# Patient Record
Sex: FEMALE | Race: WHITE
[De-identification: names, ages, dates, MRNs, and addresses within clinical notes are randomized per-mention and may not be internally consistent; named-entity substitution may affect disease eponyms.]

---

## 2018-10-23 ENCOUNTER — HOSPITAL ENCOUNTER (EMERGENCY)
Dept: HOSPITAL 62 - ER | Age: 18
Discharge: HOME | End: 2018-10-23
Payer: COMMERCIAL

## 2018-10-23 VITALS — DIASTOLIC BLOOD PRESSURE: 66 MMHG | SYSTOLIC BLOOD PRESSURE: 105 MMHG
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
## 2018-10-23 DIAGNOSIS — S62.511A: Primary | ICD-10-CM

## 2018-10-23 PROCEDURE — 99283 EMERGENCY DEPT VISIT LOW MDM: CPT

## 2018-10-23 NOTE — ER DOCUMENT REPORT
ED Hand/Wrist Injury





- General


Chief Complaint: Finger Injury


Stated Complaint: RIGHT THUMB PAIN


Time Seen by Provider: 10/23/18 21:25


Mode of Arrival: Ambulatory


Information source: Patient


Notes: 





18-year-old female presented to ED for complaint of pain to her right thumb.  

She states she thinks she broke it while doing martial arts.  She states she 

landed on her thumb and someone else landed on top of her.  The thumb is 

deformed.  Is alert and oriented respirations regular and unlabored speaking in 

full sentences walk with a even steady gait.


TRAVEL OUTSIDE OF THE [place] IN LAST 30 DAYS: No





- HPI


Injury to: Thumb


Onset: Just prior to arrival


Where: Public place


Timing: Still present


Quality of pain: Sharp, Throbbing


Severity: Severe


Pain Level: 5


Context: Fall, Swelling





Past Medical History





- General


Information source: Patient





- Social History


Smoking Status: Never Smoker


Cigarette use (# per day): No


Chew tobacco use (# tins/day): No


Smoking Education Provided: No


Frequency of alcohol use: None


Drug Abuse: None


Occupation: cna


Lives with: Parents


Family History: Reviewed & Not Pertinent


Patient has suicidal ideation: No


Patient has homicidal ideation: No





- Past Medical History


Cardiac Medical History: Reports: None


Pulmonary Medical History: Reports: None


EENT Medical History: Reports: None


Neurological Medical History: Reports: None


Endocrine Medical History: Reports: None


Renal/ Medical History: Reports: None


Malignancy Medical History: Reports: None


GI Medical History: Reports: None


Musculoskeletal Medical History: Reports None


Skin Medical History: Reports None


Psychiatric Medical History: Reports: None


Traumatic Medical History: Reports: None


Infectious Medical History: Reports: None


Surgical Hx: Negative





- Immunizations


Immunizations up to date: Yes


Hx Diphtheria, Pertussis, Tetanus Vaccination: Yes





Review of Systems





- Review of Systems


Constitutional: No symptoms reported


EENT: No symptoms reported


Cardiovascular: No symptoms reported


Respiratory: No symptoms reported


Gastrointestinal: No symptoms reported


Genitourinary: No symptoms reported


Female Genitourinary: No symptoms reported


Musculoskeletal: Other - Broken and deformed right base of the proximal phalanx 

of the thumb bruised swollen


Skin: No symptoms reported


Hematologic/Lymphatic: No symptoms reported


Neurological/Psychological: No symptoms reported


-: Yes All other systems reviewed and negative





Physical Exam





- Vital signs


Vitals: 


 











Temp Pulse Resp BP Pulse Ox


 


 98.4 F   91   16   119/73   100 


 


 10/23/18 21:16  10/23/18 21:16  10/23/18 21:16  10/23/18 21:16  10/23/18 21:16











Interpretation: Normal





- General


General appearance: Appears well, Alert





- HEENT


Head: Normocephalic, Atraumatic


Eyes: Normal


Pupils: PERRL





- Respiratory


Respiratory status: No respiratory distress


Chest status: Nontender


Breath sounds: Normal


Chest palpation: Normal





- Cardiovascular


Rhythm: Regular


Heart sounds: Normal auscultation


Murmur: No





- Abdominal


Inspection: Normal


Distension: No distension


Bowel sounds: Normal


Tenderness: Nontender


Organomegaly: No organomegaly





- Back


Back: Normal, Nontender





- Extremities


General upper extremity: Normal temperature


General lower extremity: Normal inspection, Nontender, Normal color, Normal ROM

, Normal temperature, Normal weight bearing.  No: Nilsa's sign


Hand: Tender, Deformity, Ecchymosis, Instability, No evidence of human bite, No 

evidence of FB, Swelling.  No: Abrasion, Dislocation, Laceration, Nail injury





- Neurological


Neuro grossly intact: Yes


Cognition: Normal


Orientation: AAOx4


Fonda Coma Scale Eye Opening: Spontaneous


Fonda Coma Scale Verbal: Oriented


Fonda Coma Scale Motor: Obeys Commands


Orlin Coma Scale Total: 15


Speech: Normal


Motor strength normal: LUE, RUE, LLE, RLE


Sensory: Normal





- Psychological


Associated symptoms: Normal affect, Normal mood





- Skin


Skin Temperature: Warm


Skin Moisture: Dry


Skin Color: Normal





Course





- Re-evaluation


Re-evalutation: 





10/23/18 22:34


Chest x-ray with Dr. Medrano and Dr. Santos.  Dr. Santos stated that the 

patient should come to [facility] for surgery tomorrow around noon.  Patient 

was treated with Percocet then a splint applied to her hand then she was given 

a Norco dispense pack to take home for her pain.  Patient was given 

instructions for elevation and ice.  Patient is to follow-up with orthopedics 

tomorrow.  Patient tolerated procedures well.





- Vital Signs


Vital signs: 


 











Temp Pulse Resp BP Pulse Ox


 


 98.4 F   74   18   105/66   99 


 


 10/23/18 22:10  10/23/18 22:10  10/23/18 22:10  10/23/18 22:10  10/23/18 22:10














- Diagnostic Test


Radiology reviewed: Image reviewed, Reports reviewed





Procedures





- Immobilization


  ** Right Hand Thumb


Immobilizer type: Thumb spica, Sling


Performed by: PCT


Post-Proc Neuro Vasc Exam: Normal


Alignment checked and good: Yes





Discharge





- Discharge


Clinical Impression: 


 Right thumb comminuted fracture





Condition: Stable


Disposition: HOME, SELF-CARE


Additional Instructions: 


Fractured Thumb





     There is a fracture in your thumb.  There is a comminuted fracture which 

means the bone is in multiple pieces.  The doctor has assessed the seriousness 

of the fracture and has explained your treatment plan.  Because the thumb is 

mobile and vulnerable to reinjury, this fracture requires greater protection 

than a finger fracture.


     Usually, the thumb will be splinted until fracture healing is complete.  A 

cast is sometimes required, although this depends more on the individual patient

's activities than on the nature of the fracture.  Your thumb is fractured into 

multiple pieces and may require surgery to repair the fractures.  


     


     Call the doctor or return at once if pain or swelling becomes severe, or 

if the thumb becomes numb.  Some degree of bruising is normal with a thumb 

fracture.





Splint Pending Casting





     Your injury can't be casted until the swelling has subsided. Therefore, a 

temporary splint has been placed to protect the injury.


     Full use of an injured area is not possible in a splint.  You should 

follow the doctor's instructions concerning rest, ice, and elevation of the 

injury.  Never do anything which causes pain under the splint.


     Keep the splint on ALL THE TIME until you return for casting.  If there is 

unexpected severe pain, or numbness, discoloration, or swelling beyond the 

splint, you should return at once.








ICE & ELEVATION:


     Apply ice packs frequently against the painful area.  Many different 

schedules are recommended, such as "20 minutes on, 20 minutes off" or "one hour 

ice, two hours rest."  If you need to work, you may need to go longer between 

ice treatments.  You should plan to have the area ice packed AT LEAST one-

fourth of the time.


     The ice should be applied over the wrap, tape, or splint, or over a layer 

of cloth -- not directly against the skin.  Some ice bags have a built-in cloth 

and can be put directly on the skin.


     Your injured part should be elevated as much as possible over the next 48 

hours.  Try to keep the injury above the level of the heart. Avoid use of the 

injured area.  Elevation and rest will decrease the swelling.








USE OF OVER-THE-COUNTER IBUPROFEN:


     Ibuprofen (Advil, Nuprin, Medipren, Motrin IB) is a medication for fever 

and pain control.  In addition, it has anti- inflammatory effects which may be 

beneficial, especially in the treatment of injuries.


     It's best to take ibuprofen with food.  Persons with ulcer disease or 

allergy to aspirin should notify their physician of this before taking 

ibuprofen.


     Ibuprofen can be given every four to six hours, for a total of four doses 

daily.


     Age              Pain or fever dose          Antiinflammatory dose


     6-8 yr              200 mg (1 tab)                200 mg (1 tab)


     9-11 yr             200 mg (1 tab)                200-400 mg (1-2 tab)


     11-14 yr            200-400 mg (1-2 tab)         400 mg (2 tab)


     15-adult            400 mg (2 tab)                600 mg (3 tab)








ORAL NARCOTIC MEDICATION:


     You have been given a prescription for pain control.  This medication is a 

narcotic.  It's best taken with food, as nausea can result if taken on an empty 

stomach.


     Don't operate machinery or drive within six hours of taking this 

medication.  Do not combine this medicine with alcohol, or with any medication 

which can cause sedation (such as cold tablets or sleeping pills) unless you 

get permission from the physician.


     Narcotics tend to cause constipation.  If possible, drink plenty of fluids 

and eat a diet high in fiber and fruits.





     Please be aware that prescription narcotics also have the potential for 

abuse.  People become addicted to these medications because of the general 

sense of wellbeing that they induce.  This feeling along with a significant 

reduction in tension, anxiety, and aggression provides a stimulating seductive 

quality to these drugs.  Once your pain is under control, we encourage you to 

discard your unused narcotics.








FOLLOW-UP CARE: Spoken with Dr. Tom naqvi he stated that you should be at 

the office at noon tomorrow.  Call the office first thing in the morning and 

let them know that he stated you should be seen at noon fracture.


If you have been referred to a physician for follow-up care, call the physician

s office for an appointment as you were instructed or within the next two days.

  If you experience worsening or a significant change in your symptoms, notify 

the physician immediately or return to the Emergency Department at any time for 

re-evaluation.


Forms:  Return to Work


Referrals: 


LEO GRAY MD [Primary Care Provider] - Follow up as needed


SUSAN SANTOS MD [ACTIVE STAFF] - Follow up as needed

## 2018-10-23 NOTE — RADIOLOGY REPORT (SQ)
3 VIEWS OF THE RIGHT HAND



HISTORY: Thumb injury.



COMPARISON: None.



FINDINGS/IMPRESSION: 



Mildly displaced and comminuted fracture of the proximal phalanx

of the thumb with mild medial angulation. Surrounding soft tissue

swelling is seen.

## 2018-10-26 ENCOUNTER — HOSPITAL ENCOUNTER (OUTPATIENT)
Dept: HOSPITAL 62 - OROUT | Age: 18
Discharge: HOME | End: 2018-10-26
Attending: ORTHOPAEDIC SURGERY
Payer: COMMERCIAL

## 2018-10-26 VITALS — DIASTOLIC BLOOD PRESSURE: 60 MMHG | SYSTOLIC BLOOD PRESSURE: 100 MMHG
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
## 2018-10-26 DIAGNOSIS — M79.644: ICD-10-CM
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
## 2018-10-26 DIAGNOSIS — S62.609A: Primary | ICD-10-CM

## 2018-10-26 DIAGNOSIS — Z79.3: ICD-10-CM

## 2018-10-26 LAB
ANION GAP SERPL CALC-SCNC: 13 MMOL/L (ref 5–19)
BUN SERPL-MCNC: 6 MG/DL (ref 7–20)
CALCIUM: 9.5 MG/DL (ref 8.4–10.2)
CHLORIDE SERPL-SCNC: 105 MMOL/L (ref 98–107)
CO2 SERPL-SCNC: 26 MMOL/L (ref 22–30)
ERYTHROCYTE [DISTWIDTH] IN BLOOD BY AUTOMATED COUNT: 12.9 % (ref 11.5–14)
GLUCOSE SERPL-MCNC: 96 MG/DL (ref 75–110)
HCT VFR BLD CALC: 41.2 % (ref 36–47)
HGB BLD-MCNC: 14.2 G/DL (ref 12–15.5)
MCH RBC QN AUTO: 31.2 PG (ref 27–33.4)
MCHC RBC AUTO-ENTMCNC: 34.4 G/DL (ref 32–36)
MCV RBC AUTO: 91 FL (ref 80–97)
PLATELET # BLD: 241 10^3/UL (ref 150–450)
POTASSIUM SERPL-SCNC: 3.7 MMOL/L (ref 3.6–5)
RBC # BLD AUTO: 4.54 10^6/UL (ref 3.72–5.28)
SODIUM SERPL-SCNC: 143.7 MMOL/L (ref 137–145)
WBC # BLD AUTO: 5.1 10^3/UL (ref 4–10.5)

## 2018-10-26 PROCEDURE — 81025 URINE PREGNANCY TEST: CPT

## 2018-10-26 PROCEDURE — 26735 TREAT FINGER FRACTURE EACH: CPT

## 2018-10-26 PROCEDURE — 73140 X-RAY EXAM OF FINGER(S): CPT

## 2018-10-26 PROCEDURE — 80048 BASIC METABOLIC PNL TOTAL CA: CPT

## 2018-10-26 PROCEDURE — 85027 COMPLETE CBC AUTOMATED: CPT

## 2018-10-26 PROCEDURE — 36415 COLL VENOUS BLD VENIPUNCTURE: CPT

## 2018-10-26 PROCEDURE — 01830 ANES ARTHR/NDSC WRST/HND NOS: CPT

## 2018-10-26 PROCEDURE — C1713 ANCHOR/SCREW BN/BN,TIS/BN: HCPCS

## 2018-10-26 NOTE — RADIOLOGY REPORT (SQ)
EXAM DESCRIPTION:  NO CHG FLUORO; FINGER RIGHT



COMPLETED DATE/TIME:  10/26/2018 11:00 am



REASON FOR STUDY:  ORIF RT THUMB ASST WITH FLUORO IN OR



COMPARISON:  None.



FLUOROSCOPY TIME:  25 seconds

2 Images saved to PACS



LIMITATIONS:  None.



PROCEDURE:  ORIF right thumb fracture.



FINDINGS:  2 images obtained with fluoro document placement of a plate secured by multiple screws.



IMPRESSION:  ORIF right thumb.  Refer to operative note for further information.



COMMENT:  PQRS [id number]:  Fluoroscopy time of the procedure is documented in the report.



TECHNICAL DOCUMENTATION:  JOB ID:  [id number]

 2011 [employer]- All Rights Reserved



Reading location - IP/workstation name: [email or web address]

## 2018-10-26 NOTE — OPERATIVE REPORT
Operative Report


DATE OF SURGERY: 10/26/18


PREOPERATIVE DIAGNOSIS: Right thumb right thumb proximal phalanx fracture


OPERATION: Open reduction internal fixation right thumb proximal phalanx 

fracture


SURGEON: SUSAN WATSON


ANESTHESIA: GA


ESTIMATED BLOOD LOSS: Minimal


PROCEDURE: 





Hardware: [employer] hand system 1.7 mm





With the patient supine Afrin table the right upper extremities prepped and 

draped in sterile fashion.  Limb was elevated for exsanguination tourniquet 

inflated to 250 torr.  A longitudinal incision was made over the dorsal radial 

corner of the proximal phalanx of the right thumb and sharp dissection was 

carried incision through the periosteum.  The periosteum was elevated.  The 

fracture was reduced under direct visualization.  A titanium T plate is applied 

and 3 screws were placed proximally and 3 screws were placed distally.  

Fracture reduction hardware placement checked fluoroscopically and felt to be 

adequate.  At this point the tourniquet is deflated.  Hemostasis obtained with 

bipolar cautery.  Wound is irrigated.  Disclosures interrupted 4-0 nylon 

suture.  A sterile compressive dressing was applied and the patient's return to 

PACU in satisfactory condition.

## 2018-10-26 NOTE — DISCHARGE SUMMARY
Discharge Summary (SDC)





- Discharge


Final Diagnosis: 





r thumb prox phalanx fracture


Date of Surgery: 10/26/18


Discharge Date: 10/26/18


Condition: Good


Treatment or Instructions: 


Elevate right upper extremity


Prescriptions: 


Oxycodone HCl/Acetaminophen [Percocet 5-325 mg Tablet] 1 tab PO Q6 PRN #40 

tablet


 PRN Reason: 


Referrals: 


OC CALDERON MD [Primary Care Provider] - 


Discharge Diet: As Tolerated, Regular


Respiratory Treatments at Home: Deep Breathing/Coughing


Discharge Activity: Balance Activity w/Rest, No tub bath


Home Care Assistance: None Needed


Report the Following to Your Physician Immediately: Shortness of Breath, Fever 

over 101 Degrees, Drainage-Foul Smelling

## 2018-10-26 NOTE — RADIOLOGY REPORT (SQ)
EXAM DESCRIPTION:  NO CHG FLUORO; FINGER RIGHT



COMPLETED DATE/TIME:  10/26/2018 11:00 am



REASON FOR STUDY:  ORIF RT THUMB ASST WITH FLUORO IN OR



COMPARISON:  None.



FLUOROSCOPY TIME:  25 seconds

2 Images saved to PACS



LIMITATIONS:  None.



PROCEDURE:  ORIF right thumb fracture.



FINDINGS:  2 images obtained with fluoro document placement of a plate secured by multiple screws.



IMPRESSION:  ORIF right thumb.  Refer to operative note for further information.



COMMENT:  PQRS [id number]:  Fluoroscopy time of the procedure is documented in the report.



TECHNICAL DOCUMENTATION:  JOB ID:  [id number]

 2011 [employer]- All Rights Reserved



Reading location - IP/workstation name: [email or web address]